# Patient Record
Sex: MALE | Race: WHITE | NOT HISPANIC OR LATINO | URBAN - METROPOLITAN AREA
[De-identification: names, ages, dates, MRNs, and addresses within clinical notes are randomized per-mention and may not be internally consistent; named-entity substitution may affect disease eponyms.]

---

## 2017-05-15 ENCOUNTER — INPATIENT (INPATIENT)
Facility: HOSPITAL | Age: 27
LOS: 9 days | Discharge: HOME | End: 2017-05-25
Attending: PSYCHIATRY & NEUROLOGY | Admitting: PSYCHIATRY & NEUROLOGY
Payer: COMMERCIAL

## 2017-05-15 VITALS
HEART RATE: 75 BPM | SYSTOLIC BLOOD PRESSURE: 149 MMHG | OXYGEN SATURATION: 100 % | TEMPERATURE: 98 F | RESPIRATION RATE: 16 BRPM | DIASTOLIC BLOOD PRESSURE: 101 MMHG

## 2017-05-15 DIAGNOSIS — F31.9 BIPOLAR DISORDER, UNSPECIFIED: ICD-10-CM

## 2017-05-15 LAB
ALBUMIN SERPL ELPH-MCNC: 4.7 G/DL — SIGNIFICANT CHANGE UP (ref 3.3–5)
ALP SERPL-CCNC: 38 U/L — LOW (ref 40–120)
ALT FLD-CCNC: 15 U/L — SIGNIFICANT CHANGE UP (ref 4–41)
AMPHET UR-MCNC: NEGATIVE — SIGNIFICANT CHANGE UP
APAP SERPL-MCNC: < 15 UG/ML — LOW (ref 15–25)
APPEARANCE UR: CLEAR — SIGNIFICANT CHANGE UP
AST SERPL-CCNC: 25 U/L — SIGNIFICANT CHANGE UP (ref 4–40)
BARBITURATES MEASUREMENT: NEGATIVE — SIGNIFICANT CHANGE UP
BARBITURATES UR SCN-MCNC: NEGATIVE — SIGNIFICANT CHANGE UP
BASOPHILS # BLD AUTO: 0.06 K/UL — SIGNIFICANT CHANGE UP (ref 0–0.2)
BASOPHILS NFR BLD AUTO: 0.5 % — SIGNIFICANT CHANGE UP (ref 0–2)
BENZODIAZ SERPL-MCNC: NEGATIVE — SIGNIFICANT CHANGE UP
BENZODIAZ UR-MCNC: NEGATIVE — SIGNIFICANT CHANGE UP
BILIRUB SERPL-MCNC: 0.5 MG/DL — SIGNIFICANT CHANGE UP (ref 0.2–1.2)
BILIRUB UR-MCNC: NEGATIVE — SIGNIFICANT CHANGE UP
BLOOD UR QL VISUAL: NEGATIVE — SIGNIFICANT CHANGE UP
BUN SERPL-MCNC: 22 MG/DL — SIGNIFICANT CHANGE UP (ref 7–23)
CALCIUM SERPL-MCNC: 9.3 MG/DL — SIGNIFICANT CHANGE UP (ref 8.4–10.5)
CANNABINOIDS UR-MCNC: NEGATIVE — SIGNIFICANT CHANGE UP
CHLORIDE SERPL-SCNC: 104 MMOL/L — SIGNIFICANT CHANGE UP (ref 98–107)
CO2 SERPL-SCNC: 25 MMOL/L — SIGNIFICANT CHANGE UP (ref 22–31)
COCAINE METAB.OTHER UR-MCNC: NEGATIVE — SIGNIFICANT CHANGE UP
COLOR SPEC: SIGNIFICANT CHANGE UP
CREAT SERPL-MCNC: 1.49 MG/DL — HIGH (ref 0.5–1.3)
EOSINOPHIL # BLD AUTO: 0.31 K/UL — SIGNIFICANT CHANGE UP (ref 0–0.5)
EOSINOPHIL NFR BLD AUTO: 2.7 % — SIGNIFICANT CHANGE UP (ref 0–6)
ETHANOL BLD-MCNC: < 10 MG/DL — SIGNIFICANT CHANGE UP
GLUCOSE SERPL-MCNC: 91 MG/DL — SIGNIFICANT CHANGE UP (ref 70–99)
GLUCOSE UR-MCNC: NEGATIVE — SIGNIFICANT CHANGE UP
HCT VFR BLD CALC: 41 % — SIGNIFICANT CHANGE UP (ref 39–50)
HGB BLD-MCNC: 14.4 G/DL — SIGNIFICANT CHANGE UP (ref 13–17)
IMM GRANULOCYTES NFR BLD AUTO: 0.3 % — SIGNIFICANT CHANGE UP (ref 0–1.5)
KETONES UR-MCNC: NEGATIVE — SIGNIFICANT CHANGE UP
LEUKOCYTE ESTERASE UR-ACNC: NEGATIVE — SIGNIFICANT CHANGE UP
LYMPHOCYTES # BLD AUTO: 28.4 % — SIGNIFICANT CHANGE UP (ref 13–44)
LYMPHOCYTES # BLD AUTO: 3.3 K/UL — SIGNIFICANT CHANGE UP (ref 1–3.3)
MCHC RBC-ENTMCNC: 31.8 PG — SIGNIFICANT CHANGE UP (ref 27–34)
MCHC RBC-ENTMCNC: 35.1 % — SIGNIFICANT CHANGE UP (ref 32–36)
MCV RBC AUTO: 90.5 FL — SIGNIFICANT CHANGE UP (ref 80–100)
METHADONE UR-MCNC: NEGATIVE — SIGNIFICANT CHANGE UP
MONOCYTES # BLD AUTO: 0.65 K/UL — SIGNIFICANT CHANGE UP (ref 0–0.9)
MONOCYTES NFR BLD AUTO: 5.6 % — SIGNIFICANT CHANGE UP (ref 2–14)
NEUTROPHILS # BLD AUTO: 7.27 K/UL — SIGNIFICANT CHANGE UP (ref 1.8–7.4)
NEUTROPHILS NFR BLD AUTO: 62.5 % — SIGNIFICANT CHANGE UP (ref 43–77)
NITRITE UR-MCNC: NEGATIVE — SIGNIFICANT CHANGE UP
OPIATES UR-MCNC: NEGATIVE — SIGNIFICANT CHANGE UP
OXYCODONE UR-MCNC: NEGATIVE — SIGNIFICANT CHANGE UP
PCP UR-MCNC: NEGATIVE — SIGNIFICANT CHANGE UP
PH UR: 6.5 — SIGNIFICANT CHANGE UP (ref 4.6–8)
PLATELET # BLD AUTO: 195 K/UL — SIGNIFICANT CHANGE UP (ref 150–400)
PMV BLD: 10.5 FL — SIGNIFICANT CHANGE UP (ref 7–13)
POTASSIUM SERPL-MCNC: 3.9 MMOL/L — SIGNIFICANT CHANGE UP (ref 3.5–5.3)
POTASSIUM SERPL-SCNC: 3.9 MMOL/L — SIGNIFICANT CHANGE UP (ref 3.5–5.3)
PROT SERPL-MCNC: 6.8 G/DL — SIGNIFICANT CHANGE UP (ref 6–8.3)
PROT UR-MCNC: NEGATIVE — SIGNIFICANT CHANGE UP
RBC # BLD: 4.53 M/UL — SIGNIFICANT CHANGE UP (ref 4.2–5.8)
RBC # FLD: 13.3 % — SIGNIFICANT CHANGE UP (ref 10.3–14.5)
RBC CASTS # UR COMP ASSIST: SIGNIFICANT CHANGE UP (ref 0–?)
SALICYLATES SERPL-MCNC: < 5 MG/DL — LOW (ref 15–30)
SODIUM SERPL-SCNC: 145 MMOL/L — SIGNIFICANT CHANGE UP (ref 135–145)
SP GR SPEC: 1 — LOW (ref 1–1.03)
TSH SERPL-MCNC: 6.31 UIU/ML — HIGH (ref 0.27–4.2)
UROBILINOGEN FLD QL: NORMAL E.U. — SIGNIFICANT CHANGE UP (ref 0.1–0.2)
WBC # BLD: 11.62 K/UL — HIGH (ref 3.8–10.5)
WBC # FLD AUTO: 11.62 K/UL — HIGH (ref 3.8–10.5)
WBC UR QL: SIGNIFICANT CHANGE UP (ref 0–?)

## 2017-05-15 NOTE — ED BEHAVIORAL HEALTH ASSESSMENT NOTE - OTHER PAST PSYCHIATRIC HISTORY (INCLUDE DETAILS REGARDING ONSET, COURSE OF ILLNESS, INPATIENT/OUTPATIENT TREATMENT)
PPH of bipolar disorder, one prior admission to [facility] for 2 weeks over 1 month ago, previously on abilify and recently changed to saphris 9 days ago by outpatient psychiatrist Dr. Aditya Coreas, no prior suicide attempts or SIB

## 2017-05-15 NOTE — ED BEHAVIORAL HEALTH ASSESSMENT NOTE - RISK ASSESSMENT
high. patient is non compliant with medications, disorganized, paranoid,  acting bizarre, aggressive towards family, disappearing and wandering for miles. He is an imminent risk to self and others requiring inpatient admission.

## 2017-05-15 NOTE — ED BEHAVIORAL HEALTH ASSESSMENT NOTE - HPI (INCLUDE ILLNESS QUALITY, SEVERITY, DURATION, TIMING, CONTEXT, MODIFYING FACTORS, ASSOCIATED SIGNS AND SYMPTOMS)
27yo  male, single, domiciled with parents, unemployed (used to work as an  at [employer] until 3 yrs ago), PPH of bipolar disorder, one prior admission to [facility] for 2 weeks over 1 month ago, previously on abilify and recently changed to saphris 9 days ago by outpatient psychiatrist Dr. Aditya Coreas, no prior suicide attempts or SIB, no legal history, no history of drug use or significant PMH, brought in by father for worsening aggression and disorganization.     Collateral obtained from father Bill Jordan, present in ED. He reports that patient started to decompensate about 3yrs ago. Prior to that he was working for [employer] and doing well. 27yo  male, single, domiciled with parents, unemployed (used to work as an  at [employer] until 3 yrs ago), PPH of bipolar disorder, one prior admission to [facility] for 2 weeks over 1 month ago, previously on abilify and recently changed to saphris 9 days ago by outpatient psychiatrist Dr. Aditya Coreas, no prior suicide attempts or SIB, no legal history, no history of drug use or significant PMH, brought in by father for worsening aggression and disorganization.     Collateral obtained from father Blil Jordan, present in ED. He reports that patient started to decompensate about 3yrs ago. Prior to that he was working for [employer] and doing well. Patient was hospitalized at [facility] over 1 month ago for about 2 weeks after a similar presentation. He was put on abilify and titrated up to 20mg then discharged. His outpatient provider raised it to 30mg and it did not work so he was recently switched to Saphris 10mg 9 days ago. Father reports patient has not been doing well. He has been increasingly aggressive and paranoid, going up to father and mother and chest bumping them while screaming "fuck you" to them. He has been more hostile, cursing, severely psychotic, talking to himself and laughing to himself. He has been making random statements such as "Even the [employer] couldn't make things work". Today patient took everything in his room and threw it in the hallway. He then went online and spent thousands of dollars buying [employer] clothing, for no apparent reason. He has also been making statements such as "I'm not feeling well, I don't want to live". He has also been leaving the house and walking for miles. This morning the patient placed his finger in his rectum then placed that finger in his nostril. Patient has likely been non compliant with his medications as father brought all patient's medications and very few from the blister packs were opened. Father is concerned for the patient based on the level of disorganization he has been seeing and mother is fearful as patient has been hostile. They are requesting admission at this time. 27yo  male, single, domiciled with parents, unemployed (used to work as an  at [employer] until 3 yrs ago), PPH of bipolar disorder, one prior admission to [facility] for 2 weeks over 1 month ago, previously on abilify and recently changed to saphris 9 days ago by outpatient psychiatrist Dr. Aditya Coreas, no prior suicide attempts or SIB, no legal history, no history of drug use or significant PMH, brought in by father for worsening aggression and disorganization.     Collateral obtained from father Bill Jordan, present in ED. He reports that patient started to decompensate about 3yrs ago. Prior to that he was working for [employer] and doing well. Patient was hospitalized at [facility] over 1 month ago for about 2 weeks after a similar presentation. He was put on abilify and titrated up to 20mg then discharged. His outpatient provider raised it to 30mg and it did not work so he was recently switched to Saphris 10mg 9 days ago. Father reports patient has not been doing well. He has been increasingly aggressive and paranoid, going up to father and mother and chest bumping them while screaming "fuck you" to them. He has been more hostile, cursing, severely psychotic, talking to himself and laughing to himself. He has been making random statements such as "Even the [employer] couldn't make things work". Today patient took everything in his room and threw it in the hallway. He then went online and spent thousands of dollars buying [employer] clothing, for no apparent reason. He has also been making statements such as "I'm not feeling well, I don't want to live". He has also been leaving the house and walking for miles. This morning the patient placed his finger in his rectum then placed that finger in his nostril. Patient has likely been non compliant with his medications as father brought all patient's medications and very few from the blister packs were opened. Father is concerned for the patient based on the level of disorganization he has been seeing and mother is fearful as patient has been hostile. They are requesting admission at this time.    Patient reports he is not sure why he is in the hospital. Reports that his mother was concerned because he was cleaning his closet. "I'm a transgendered homosexual", he reports, which is why he was cleaning out his closet. Denies all psychiatric symptoms. Reports taking his medications. When confronted with collateral and unopened medication reports, patient became quiet and said that he would go home and take his medications, refuses to answer rest of questions about collateral. Reports he is sleeping "about 4hrs" at night, denies decreased energy or sleepiness during the day. Agreed to voluntary admission for re-evaluation of medications and for safety.

## 2017-05-15 NOTE — ED BEHAVIORAL HEALTH ASSESSMENT NOTE - SUICIDE PROTECTIVE FACTORS
Positive therapeutic relationships/Identifies reasons for living/Supportive social network or family/Future oriented

## 2017-05-15 NOTE — ED PROVIDER NOTE - MEDICAL DECISION MAKING DETAILS
25 y/o M hx Psychosis  No evidence of physical injuries, broken skin or deformities.   Medical evaluation performed. There is no clinical evidence of intoxication or any acute medical problem requiring immediate intervention. Patient is awaiting psychiatric consultation. Final disposition will be determined by psychiatrist. 25 y/o M hx Psychosis  No evidence of physical injuries, broken skin or deformities. Labs, Urine Tox/UA, EKG     Medical evaluation performed. There is no clinical evidence of intoxication or any acute medical problem requiring immediate intervention. Patient is awaiting psychiatric consultation. Final disposition will be determined by psychiatrist.

## 2017-05-15 NOTE — ED ADULT NURSE NOTE - CHIEF COMPLAINT QUOTE
pt. with a hx. of psychosis states he was organizing his closet earlier today and his mother was concerned he was having disorganized thinking pattern. Pt. denies taking any drugs or alcohol. Pt. denies any thoughts of hurting himself or others. Pt. states he is feeling great. Pt. appears calm in room. BH attending called , will be brought over to .

## 2017-05-15 NOTE — ED ADULT TRIAGE NOTE - CHIEF COMPLAINT QUOTE
pt. with a hx. of psychosis states he was organizing his closet earlier today and his mother was concerned he was having disorganized thinking pattern. Pt. denies taking any drugs or alcohol. Pt. denies any thoughts of hurting himself or others. Pt. states he is feeling great. Pt. appears calm in room. pt. with a hx. of psychosis states he was organizing his closet earlier today and his mother was concerned he was having disorganized thinking pattern. Pt. denies taking any drugs or alcohol. Pt. denies any thoughts of hurting himself or others. Pt. states he is feeling great. Pt. appears calm in room. BH attending called , will be brought over to .

## 2017-05-15 NOTE — ED ADULT NURSE NOTE - OBJECTIVE STATEMENT
pt seen, as per pt "my mom thinks i'm having disorganized thought pattern". pt denies SI,HI&AH. denies ETOH or substance use. pt calm and cooperative in . psych eval ongoing.

## 2017-05-15 NOTE — ED BEHAVIORAL HEALTH ASSESSMENT NOTE - SUMMARY
25yo  male, single, domiciled with parents, unemployed (used to work as an  at [employer] until 3 yrs ago), PPH of bipolar disorder, one prior admission to [facility] for 2 weeks over 1 month ago, previously on abilify and recently changed to saphris 9 days ago by outpatient psychiatrist Dr. Aditya Coreas, no prior suicide attempts or SIB, no legal history, no history of drug use or significant PMH, brought in by father for worsening aggression and disorganization.     Patient denies all symptoms in ED. Per collateral obtained from father, patient has been decompensating. He is non compliant with his medications, spending thousands of dollars on clothing, disorganized, threatening and becoming aggressive towards them, doing bizarre things (such as placing finger in his rectum then in his nose) and not at his baseline. He is severely decompensated and requires inpatient admission for safety and stabilization.

## 2017-05-15 NOTE — ED BEHAVIORAL HEALTH ASSESSMENT NOTE - PSYCHIATRIC ISSUES AND PLAN (INCLUDE STANDING AND PRN MEDICATION)
would consider d/c saphris and starting risperdal or other medication with RODRIGUES option. Haldol 5mg PO q 4 hrs prn psychosis; Ativan 2mg PO q 4hrs prn anxiety, Diphenhydramine 50mg, PO q 4hr prn EPS, insomnia or agitaition.      Haldol 5mg IM; Ativan 2mg IM, Diphenhydramine 50mg IM once prn severe agitation (combativeness, assaultive behavior)

## 2017-05-15 NOTE — ED PROVIDER NOTE - OBJECTIVE STATEMENT
27 y/o M hx Psychosis BIB parent w need for evaluation as patient  stated " They think im disorganised" secondary to him arranging his closet. Denies  punching or kicking any objects. Denies pain, SOB, fever, chills, chest/ abdominal discomfort. Denies SI/HI/AH/VH. Denies recent use of alcohol or illicit drugs.

## 2017-05-16 DIAGNOSIS — R45.1 RESTLESSNESS AND AGITATION: ICD-10-CM

## 2017-05-16 PROCEDURE — 99222 1ST HOSP IP/OBS MODERATE 55: CPT

## 2017-05-16 RX ORDER — DIPHENHYDRAMINE HCL 50 MG
50 CAPSULE ORAL ONCE
Qty: 0 | Refills: 0 | Status: DISCONTINUED | OUTPATIENT
Start: 2017-05-16 | End: 2017-05-25

## 2017-05-16 RX ORDER — PALIPERIDONE 1.5 MG/1
3 TABLET, EXTENDED RELEASE ORAL AT BEDTIME
Qty: 0 | Refills: 0 | Status: DISCONTINUED | OUTPATIENT
Start: 2017-05-16 | End: 2017-05-17

## 2017-05-16 RX ORDER — HALOPERIDOL DECANOATE 100 MG/ML
5 INJECTION INTRAMUSCULAR EVERY 6 HOURS
Qty: 0 | Refills: 0 | Status: DISCONTINUED | OUTPATIENT
Start: 2017-05-16 | End: 2017-05-25

## 2017-05-16 RX ORDER — HALOPERIDOL DECANOATE 100 MG/ML
5 INJECTION INTRAMUSCULAR ONCE
Qty: 0 | Refills: 0 | Status: DISCONTINUED | OUTPATIENT
Start: 2017-05-16 | End: 2017-05-25

## 2017-05-16 RX ORDER — DIPHENHYDRAMINE HCL 50 MG
50 CAPSULE ORAL EVERY 6 HOURS
Qty: 0 | Refills: 0 | Status: DISCONTINUED | OUTPATIENT
Start: 2017-05-16 | End: 2017-05-25

## 2017-05-16 RX ADMIN — PALIPERIDONE 3 MILLIGRAM(S): 1.5 TABLET, EXTENDED RELEASE ORAL at 20:49

## 2017-05-17 LAB
ALBUMIN SERPL ELPH-MCNC: 4.7 G/DL — SIGNIFICANT CHANGE UP (ref 3.3–5)
ALP SERPL-CCNC: 44 U/L — SIGNIFICANT CHANGE UP (ref 40–120)
ALT FLD-CCNC: 17 U/L — SIGNIFICANT CHANGE UP (ref 4–41)
AST SERPL-CCNC: 23 U/L — SIGNIFICANT CHANGE UP (ref 4–40)
BILIRUB SERPL-MCNC: 0.5 MG/DL — SIGNIFICANT CHANGE UP (ref 0.2–1.2)
BUN SERPL-MCNC: 21 MG/DL — SIGNIFICANT CHANGE UP (ref 7–23)
CALCIUM SERPL-MCNC: 9.6 MG/DL — SIGNIFICANT CHANGE UP (ref 8.4–10.5)
CHLORIDE SERPL-SCNC: 102 MMOL/L — SIGNIFICANT CHANGE UP (ref 98–107)
CHOLEST SERPL-MCNC: 161 MG/DL — SIGNIFICANT CHANGE UP (ref 120–199)
CO2 SERPL-SCNC: 24 MMOL/L — SIGNIFICANT CHANGE UP (ref 22–31)
CREAT SERPL-MCNC: 1.21 MG/DL — SIGNIFICANT CHANGE UP (ref 0.5–1.3)
GLUCOSE SERPL-MCNC: 80 MG/DL — SIGNIFICANT CHANGE UP (ref 70–99)
HBA1C BLD-MCNC: 5.3 % — SIGNIFICANT CHANGE UP (ref 4–5.6)
HDLC SERPL-MCNC: 43 MG/DL — SIGNIFICANT CHANGE UP (ref 35–55)
LIPID PNL WITH DIRECT LDL SERPL: 99 MG/DL — SIGNIFICANT CHANGE UP
POTASSIUM SERPL-MCNC: 4.4 MMOL/L — SIGNIFICANT CHANGE UP (ref 3.5–5.3)
POTASSIUM SERPL-SCNC: 4.4 MMOL/L — SIGNIFICANT CHANGE UP (ref 3.5–5.3)
PROT SERPL-MCNC: 7.6 G/DL — SIGNIFICANT CHANGE UP (ref 6–8.3)
SODIUM SERPL-SCNC: 143 MMOL/L — SIGNIFICANT CHANGE UP (ref 135–145)
T4 FREE SERPL-MCNC: 1.43 NG/DL — SIGNIFICANT CHANGE UP (ref 0.9–1.8)
TRIGL SERPL-MCNC: 93 MG/DL — SIGNIFICANT CHANGE UP (ref 10–149)
TSH SERPL-MCNC: 3.45 UIU/ML — SIGNIFICANT CHANGE UP (ref 0.27–4.2)

## 2017-05-17 PROCEDURE — 99232 SBSQ HOSP IP/OBS MODERATE 35: CPT

## 2017-05-17 RX ORDER — PALIPERIDONE 1.5 MG/1
6 TABLET, EXTENDED RELEASE ORAL AT BEDTIME
Qty: 0 | Refills: 0 | Status: DISCONTINUED | OUTPATIENT
Start: 2017-05-17 | End: 2017-05-22

## 2017-05-17 RX ADMIN — PALIPERIDONE 6 MILLIGRAM(S): 1.5 TABLET, EXTENDED RELEASE ORAL at 21:04

## 2017-05-18 PROCEDURE — 99232 SBSQ HOSP IP/OBS MODERATE 35: CPT

## 2017-05-18 RX ORDER — PALIPERIDONE 1.5 MG/1
234 TABLET, EXTENDED RELEASE ORAL ONCE
Qty: 0 | Refills: 0 | Status: COMPLETED | OUTPATIENT
Start: 2017-05-18 | End: 2017-05-18

## 2017-05-18 RX ADMIN — PALIPERIDONE 234 MILLIGRAM(S): 1.5 TABLET, EXTENDED RELEASE ORAL at 11:34

## 2017-05-18 RX ADMIN — PALIPERIDONE 6 MILLIGRAM(S): 1.5 TABLET, EXTENDED RELEASE ORAL at 20:39

## 2017-05-19 PROCEDURE — 99232 SBSQ HOSP IP/OBS MODERATE 35: CPT

## 2017-05-19 RX ADMIN — PALIPERIDONE 6 MILLIGRAM(S): 1.5 TABLET, EXTENDED RELEASE ORAL at 22:01

## 2017-05-20 PROCEDURE — 99232 SBSQ HOSP IP/OBS MODERATE 35: CPT

## 2017-05-20 RX ORDER — DOCUSATE SODIUM 100 MG
100 CAPSULE ORAL
Qty: 0 | Refills: 0 | Status: DISCONTINUED | OUTPATIENT
Start: 2017-05-20 | End: 2017-05-25

## 2017-05-20 RX ADMIN — PALIPERIDONE 6 MILLIGRAM(S): 1.5 TABLET, EXTENDED RELEASE ORAL at 21:16

## 2017-05-21 RX ORDER — CALCIUM CARBONATE 500(1250)
1 TABLET ORAL ONCE
Qty: 0 | Refills: 0 | Status: COMPLETED | OUTPATIENT
Start: 2017-05-21 | End: 2017-05-21

## 2017-05-21 RX ADMIN — PALIPERIDONE 6 MILLIGRAM(S): 1.5 TABLET, EXTENDED RELEASE ORAL at 21:33

## 2017-05-21 RX ADMIN — Medication 1 TABLET(S): at 17:28

## 2017-05-22 PROCEDURE — 99232 SBSQ HOSP IP/OBS MODERATE 35: CPT

## 2017-05-22 RX ORDER — PALIPERIDONE 1.5 MG/1
3 TABLET, EXTENDED RELEASE ORAL AT BEDTIME
Qty: 0 | Refills: 0 | Status: DISCONTINUED | OUTPATIENT
Start: 2017-05-22 | End: 2017-05-25

## 2017-05-22 RX ORDER — PALIPERIDONE 1.5 MG/1
4.5 TABLET, EXTENDED RELEASE ORAL AT BEDTIME
Qty: 0 | Refills: 0 | Status: DISCONTINUED | OUTPATIENT
Start: 2017-05-22 | End: 2017-05-22

## 2017-05-22 RX ADMIN — Medication 50 MILLIGRAM(S): at 21:30

## 2017-05-22 RX ADMIN — PALIPERIDONE 3 MILLIGRAM(S): 1.5 TABLET, EXTENDED RELEASE ORAL at 21:30

## 2017-05-23 PROCEDURE — 99232 SBSQ HOSP IP/OBS MODERATE 35: CPT

## 2017-05-23 RX ORDER — PALIPERIDONE 1.5 MG/1
156 TABLET, EXTENDED RELEASE ORAL ONCE
Qty: 0 | Refills: 0 | Status: COMPLETED | OUTPATIENT
Start: 2017-05-24 | End: 2017-05-24

## 2017-05-23 RX ADMIN — Medication 50 MILLIGRAM(S): at 17:50

## 2017-05-23 RX ADMIN — PALIPERIDONE 3 MILLIGRAM(S): 1.5 TABLET, EXTENDED RELEASE ORAL at 21:30

## 2017-05-24 PROCEDURE — 99232 SBSQ HOSP IP/OBS MODERATE 35: CPT

## 2017-05-24 RX ADMIN — PALIPERIDONE 3 MILLIGRAM(S): 1.5 TABLET, EXTENDED RELEASE ORAL at 21:50

## 2017-05-24 RX ADMIN — PALIPERIDONE 156 MILLIGRAM(S): 1.5 TABLET, EXTENDED RELEASE ORAL at 09:04

## 2017-05-24 RX ADMIN — Medication 50 MILLIGRAM(S): at 04:51

## 2017-05-24 RX ADMIN — Medication 50 MILLIGRAM(S): at 16:19

## 2017-05-24 RX ADMIN — Medication 50 MILLIGRAM(S): at 22:15

## 2017-05-25 VITALS — TEMPERATURE: 97 F

## 2017-05-25 PROCEDURE — 99239 HOSP IP/OBS DSCHRG MGMT >30: CPT

## 2017-05-25 RX ADMIN — Medication 50 MILLIGRAM(S): at 04:15

## 2017-05-25 RX ADMIN — Medication 50 MILLIGRAM(S): at 10:00

## 2017-05-30 PROBLEM — F29 UNSPECIFIED PSYCHOSIS NOT DUE TO A SUBSTANCE OR KNOWN PHYSIOLOGICAL CONDITION: Chronic | Status: ACTIVE | Noted: 2017-05-15

## 2017-05-31 ENCOUNTER — OUTPATIENT (OUTPATIENT)
Dept: OUTPATIENT SERVICES | Facility: HOSPITAL | Age: 27
LOS: 1 days | Discharge: HOME | End: 2017-05-31

## 2017-06-01 DIAGNOSIS — F20.0 PARANOID SCHIZOPHRENIA: ICD-10-CM

## 2019-07-02 PROBLEM — Z00.00 ENCOUNTER FOR PREVENTIVE HEALTH EXAMINATION: Status: ACTIVE | Noted: 2019-07-02

## 2019-07-08 ENCOUNTER — APPOINTMENT (OUTPATIENT)
Dept: ORTHOPEDIC SURGERY | Facility: CLINIC | Age: 29
End: 2019-07-08
Payer: COMMERCIAL

## 2019-07-08 VITALS — BODY MASS INDEX: 25.62 KG/M2 | HEIGHT: 77 IN | WEIGHT: 217 LBS

## 2019-07-08 DIAGNOSIS — Z78.9 OTHER SPECIFIED HEALTH STATUS: ICD-10-CM

## 2019-07-08 PROCEDURE — 99204 OFFICE O/P NEW MOD 45 MIN: CPT

## 2019-07-08 NOTE — DISCUSSION/SUMMARY
[de-identified] : I discussed the underlying pathophysiology of the patient's condition in great detail with the patient. I also reviewed the patient's X-rays with him in great details, and answered all his questions. I told the patient that he should see a PT to increase strength and mobility of his right knee. A prescription for Physical Therapy was provided to the patient.  If there is no improvement an MRi may be considered. \par \par FU in 1 month

## 2019-07-08 NOTE — HISTORY OF PRESENT ILLNESS
[de-identified] : 29 year old male presents with right knee pain. He states that he first felt it in January, while doing cross fit and playing tennis. However, he cannot attribute it to a specific trauma. He also stopped cross fit, and worked on strengthening his right knee. Recently, he has stopped tennis and started cross fit again. He feels as if he is no longer improving. He also feels as if the knee can buckle at any time, but only occasionally, not while walking. He currently does not have any swelling, but states that he had swelling in May. He has been going to a chiropractor to try to deal with the pain.He has been treated by  a chiropractor and has never been to formal physical therapy especially a sports that\par \par XRays taken at [facility] on 5/7/19 revealed (report)\par 1) No acute fx or osseous stress reaction. Joint spaces and alignment are maintained. Small superior patellar enthesophyte formation. No areas of erosion or aggresive-appearing bone destruction are identified. No significant effusion or discrete loose body.

## 2019-07-08 NOTE — ADDENDUM
[FreeTextEntry1] : I, Ashley Haney, acted solely as a scribe for Dr. Ryan Webber on this date 07/08/2019 \par All medical record entries made by the Scribe were at my, Dr. Ryan Webber, direction and personally dictated by me on 07/08/2019 . I have reviewed the chart and agree that the record accurately reflects my personal performance of the history, physical exam, assessment and plan. I have also personally directed, reviewed, and agreed with the chart.

## 2019-07-08 NOTE — PHYSICAL EXAM
[de-identified] : Constitutional\par o Appearance : well-nourished, well developed, alert, in no acute distress \par Head and Face\par o Head :\par ¦ Inspection : atraumatic, normocephalic\par o Face :\par ¦ Inspection : no visible rash or discoloration\par Respiratory\par o Respiratory Effort: breathing unlabored \par Neurologic\par o Mental Status Examination :\par ¦ Orientation : alert and oriented X 3\par Psychiatric\par o Mood and Affect: mood normal, affect appropriate\par Cardiovascular\par o Observation/Palpation : - no swelling\par Lymphatic\par o Additional Nodes : No palpable lymph nodes present\par \par Right Lower Extremity\par o Buttock : no tenderness, swelling or deformities \par o Right Hip :\par    - Inspection/Palpation : no tenderness, no swelling or deformities\par    - Range of Motion : full and painless in all planes, no crepitance\par    - Stability : joint stability intact\par    - Strength : extension, flexion, adduction, abduction, internal rotation and external rotation 5/5 \par    - Tests: Dario’s test negative\par o Right Knee :\par ¦ Inspection/Palpation : inferior patella tenderness, no swelling\par ¦ Range of Motion : active flexion and extension full and painless, no crepitance, good patellofemoral glide without hypermobility\par ¦ Stability : no valgus or varus instability present on provocative testing\par ¦ Strength : flexion and extension 4/5\par ¦ Tests and Signs : negative Anterior Drawer, negative Lachman, negative Emelyn\par \par Left Lower Extremity\par o Buttock : no tenderness, swelling or deformities \par o Left Hip :\par    - Inspection/Palpation : no tenderness, no swelling or deformities\par    - Range of Motion : full and painless in all planes, no crepitance\par    - Stability : joint stability intact\par    - Strength : extension, flexion, adduction, abduction, internal rotation and external rotation 5/5 \par    - Tests: Dario’s test negative\par o Left Knee :\par ¦ Inspection/Palpation : no tenderness to palpation, no swelling\par ¦ Range of Motion : active flexion and extension full and painless, no crepitance, good patellofemoral glide\par ¦ Stability : no valgus or varus instability present on provocative testing\par ¦ Strength : flexion and extension 5/5\par ¦ Tests and Signs : negative Anterior Drawer, negative Lachman, negative Emelyn\par \par Gait and Station:\par Gait: gait normal, no significant extremity swelling or lymphedema, good proprioception and balance, heel walking, toe walking, running in place didn't cause discomfort, mildly tight hamstring bilaterally\par \par Radiology Results\par o Right Knee : Standing AP,lateral and tunnel and skyline views of the knee were obtained and revealed  no significant degenerative arthritis, but reveals mild flattening of medial condyle\par

## 2019-08-05 ENCOUNTER — APPOINTMENT (OUTPATIENT)
Dept: ORTHOPEDIC SURGERY | Facility: CLINIC | Age: 29
End: 2019-08-05

## 2019-08-14 ENCOUNTER — APPOINTMENT (OUTPATIENT)
Dept: ORTHOPEDIC SURGERY | Facility: CLINIC | Age: 29
End: 2019-08-14
Payer: COMMERCIAL

## 2019-08-14 VITALS — WEIGHT: 217 LBS | BODY MASS INDEX: 25.62 KG/M2 | HEIGHT: 77 IN

## 2019-08-14 PROCEDURE — 99213 OFFICE O/P EST LOW 20 MIN: CPT

## 2020-06-10 ENCOUNTER — APPOINTMENT (OUTPATIENT)
Dept: ORTHOPEDIC SURGERY | Facility: CLINIC | Age: 30
End: 2020-06-10
Payer: COMMERCIAL

## 2020-06-10 PROCEDURE — 99213 OFFICE O/P EST LOW 20 MIN: CPT

## 2020-07-23 ENCOUNTER — APPOINTMENT (OUTPATIENT)
Dept: ORTHOPEDIC SURGERY | Facility: CLINIC | Age: 30
End: 2020-07-23
Payer: COMMERCIAL

## 2020-07-23 PROCEDURE — 99214 OFFICE O/P EST MOD 30 MIN: CPT

## 2020-07-23 PROCEDURE — 73562 X-RAY EXAM OF KNEE 3: CPT | Mod: RT

## 2020-07-23 NOTE — DISCUSSION/SUMMARY
[de-identified] : I went over the pathophysiology of the patient's symptoms in great detail. We discussed the use of ice, Tylenol and anti-inflammatories to relieve pain. The patient was instructed in ROM exercises they are to do at home. At-home strengthening, and stretching exercises were discussed and demonstrated with the patient. We went over the wide ranging benefits of exercise for the patient health and I encouraged him to begin a diligent exercise program. He needs to avoid high-impact activities such as running and jumping. I recommend alternative activities such as riding a stationary bike on low tension, or the elliptical. He should focus on light weight and high repetition exercises. He  should avoid squatting, and kneeling. I am recommending the patient continue going to physical therapy to obtain increases in their strength and mobility. A prescription for PT was provided to the patient. All of his questions were answered. He understands and consents to the plan. \par \par FU 1 month.\par after continuing PT for his right knee.

## 2020-07-23 NOTE — HISTORY OF PRESENT ILLNESS
[de-identified] : 30 year old male presents with right knee pain. He states that he first felt it in January 2019 while doing cross fit and playing tennis. However, he cannot attribute it to a specific trauma. He has stopped cross fit and tennis. He occasionally feels as if the knee can buckle at any time, but not while walking. He currently does not have any swelling. He has been attending PT, but feels that it may have been too aggressive and had aggravated pain to a 5-6/10 in severity. Today he only has low grade pain and discomfort. He does not think he will ever do cross fit again, but would like to return to playing tennis in the near future. He has been up to as much as 300 pounds but is now down to 265. He would like to get to 200 which he feels is realistic. The medication he is on causes weight gain.

## 2020-07-23 NOTE — ADDENDUM
[FreeTextEntry1] : I, Sumeet Dorsey, acted solely as a scribe for Dr. Ryan Webber on this date 07/23/2020.\par All medical record entries made by the Scribe were at my, Dr. Ryan Webber, direction and personally dictated by me on 07/23/2020. I have reviewed the chart and agree that the record accurately reflects my personal performance of the history, physical exam, assessment and plan. I have also personally directed, reviewed, and agreed with the chart.

## 2020-07-23 NOTE — PHYSICAL EXAM
[de-identified] : Constitutional\par o Appearance : well-nourished, well developed, alert, in no acute distress \par Head and Face\par o Head :\par ¦ Inspection : atraumatic, normocephalic\par o Face :\par ¦ Inspection : no visible rash or discoloration\par Respiratory\par o Respiratory Effort: breathing unlabored \par Neurologic\par o Mental Status Examination :\par ¦ Orientation : alert and oriented X 3\par Psychiatric\par o Mood and Affect: mood normal, affect appropriate\par Cardiovascular\par o Observation/Palpation : - no swelling\par Lymphatic\par o Additional Nodes : No palpable lymph nodes present\par \par Right Lower Extremity\par o Buttock : no tenderness, swelling or deformities \par o Right Hip :\par    - Inspection/Palpation : ITB tenderness, no swelling or deformities, tight hamstrings\par    - Range of Motion : full and painless in all planes, no crepitance\par    - Stability : joint stability intact\par    - Strength : extension, flexion, adduction, abduction, internal rotation and external rotation 4-4+/5 \par    - Tests: Dario’s test negative\par \par o Right Knee :\par ¦ Inspection/Palpation : medial compartment tenderness, minimal inferior patella tenderness, mild medial and lateral facet tenderness, no lateral compartment tenderness, no swelling\par ¦ Range of Motion : active flexion and extension full and painless, no crepitance, mild hypermobility of the patella\par ¦ Stability : no valgus or varus instability present on provocative testing\par ¦ Strength : flexion and extension 4-4+/5\par ¦ Tests and Signs : negative Anterior Drawer, negative Lachman, negative Emelyn\par \par Left Lower Extremity\par o Buttock : no tenderness, swelling or deformities \par o Left Hip :\par    - Inspection/Palpation : no tenderness, no swelling or deformities\par    - Range of Motion : full and painless in all planes, no crepitance\par    - Stability : joint stability intact\par    - Strength : extension, flexion, adduction, abduction, internal rotation and external rotation 5/5 \par    - Tests: Dario’s test negative\par \par o Left Knee :\par ¦ Inspection/Palpation : inferior patella tenderness to palpation, no swelling\par ¦ Range of Motion : active flexion and extension full and painless, no crepitance\par ¦ Stability : no valgus or varus instability present on provocative testing\par ¦ Strength : flexion and extension 5/5\par ¦ Tests and Signs : negative Anterior Drawer, negative Lachman, negative Emelyn\par \par Gait and Station:\par Gait: gait normal, no significant extremity swelling or lymphedema, good proprioception and balance\par \par Radiology Results\par o Right Knee : Standing AP,lateral and skyline views of the knee were obtained and revealed mild sclerosis of the medial tibial plateau and the patella

## 2020-08-17 ENCOUNTER — APPOINTMENT (OUTPATIENT)
Dept: ORTHOPEDIC SURGERY | Facility: CLINIC | Age: 30
End: 2020-08-17

## 2020-08-20 ENCOUNTER — APPOINTMENT (OUTPATIENT)
Dept: ORTHOPEDIC SURGERY | Facility: CLINIC | Age: 30
End: 2020-08-20
Payer: COMMERCIAL

## 2020-08-20 PROCEDURE — 99213 OFFICE O/P EST LOW 20 MIN: CPT

## 2020-09-03 ENCOUNTER — APPOINTMENT (OUTPATIENT)
Dept: ORTHOPEDIC SURGERY | Facility: CLINIC | Age: 30
End: 2020-09-03
Payer: COMMERCIAL

## 2020-09-03 DIAGNOSIS — M77.9 ENTHESOPATHY, UNSPECIFIED: ICD-10-CM

## 2020-09-03 DIAGNOSIS — L03.019 CELLULITIS OF UNSPECIFIED FINGER: ICD-10-CM

## 2020-09-03 PROCEDURE — 99214 OFFICE O/P EST MOD 30 MIN: CPT

## 2020-09-03 NOTE — PHYSICAL EXAM
[de-identified] : Constitutional\par o Appearance : well-nourished, well developed, alert, in no acute distress \par Head and Face\par o Head :\par ¦ Inspection : atraumatic, normocephalic\par o Face :\par ¦ Inspection : no visible rash or discoloration\par Respiratory\par o Respiratory Effort: breathing unlabored \par Neurologic\par o Sensation : Normal sensation \par Psychiatric\par o Mood and Affect: mood normal, affect appropriate \par Lymphatic\par o Additional Nodes : No palpable lymph nodes present \par \par Right Upper Extremity\par o Right Elbow :\par ¦ Inspection/Palpation : no tenderness, swelling or deformities\par ¦ Range of Motion : full and painless in all planes, no crepitance\par ¦ Strength : flexion and extension 5/5\par ¦ Stability : no joint instability on provocative testing \par o Sensation : sensation intact to light touch\par Tests: Tinel’s Sign negative\par \par o Right Forearm : no tenderness, swelling or deformities \par \par o Right Wrist:\par ¦ Inspection/Palpation : no tenderness, swelling or deformities\par ¦ Range of Motion : full and painless in all planes, no crepitance\par ¦ Strength : extension, flexion, ulnar deviation and radial deviation 5/5\par ¦ Stability : no joint instability on provocative testing\par ¦ Tests/Signs : Tinel's sign negative over carpal tunnel \par \par Left Upper Extremity\par o Left Elbow :\par ¦ Inspection/Palpation : tenderness over the extensor supinator muscle mass, no medial or lateral epicondylar tenderness, no swelling or deformities\par ¦ Range of Motion : full in all planes, no crepitance, minimal pain with resisted supination\par ¦ Strength : flexion and extension 5/5\par ¦ Stability : no joint instability on provocative testing \par o Sensation : sensation intact to light touch\par Tests: Tinel’s Sign negative, no pain with extension of the middle finger\par \par o Left Forearm : no tenderness, swelling or deformities\par \par o Left Wrist:\par ¦ Inspection/Palpation : no tenderness, no swelling or deformities\par ¦ Range of Motion : full and painless in all planes, no crepitance\par ¦ Strength : extension, flexion, ulnar deviation and radial deviation 5/5\par ¦ Stability : no joint instability on provocative testing\par ¦ Tests/Signs : Tinel's sign negative over carpal tunnel\par \par o Left Hand :\par ¦ Inspection/Palpation : tenderness over the DIP joint of the long and ring fingers with slight erythema, no swelling\par ¦ Range of Motion : full arc of motion in the small joints of the hand, no discomfort elicited, independent function of the sublimis and profundus tendons of the ring and long fingers\par ¦ Strength : all intrinsic and extrinsic hand muscles 5/5\par ¦ Stability : no joint instability on provocative testing \par o Sensation : sensation intact to light touch\par \par Gait and Station:\par Gait: gait normal, no significant extremity swelling or lymphedema, good proprioception and balance

## 2020-09-03 NOTE — HISTORY OF PRESENT ILLNESS
[de-identified] : 30 year old RHD male presents complaining of left forearm discomfort, as well as redness around the nail of the left ring finger. He denies biting his nails. He had been golfing with the wrong clubs for a few weeks and feels that attributed to his pain. He felt discomfort into the left forearm but it has improved. He switched to the proper clubs, which has helped. His PT has worked on his arm a couple of times. He denies numbness and tingling. He has not had this discomfort before.  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

## 2020-09-03 NOTE — DISCUSSION/SUMMARY
[de-identified] : I discussed the underlying pathophysiology of the patient's condition in great detail with the patient. We discussed the use of ice, Tylenol and anti-inflammatories to relieve pain. The patient was instructed in ROM exercises they are to do at home. At-home strengthening, and stretching exercises were discussed and demonstrated with the patient. When he is driving, I demonstrated to him how to stretch their elbow with their hand on the wheel while they are stopped at a red light. We went over the wide ranging benefits of exercise for the patient health and I encouraged him to begin a diligent exercise program. At this time, he will start a course of physical therapy for strengthening and flexibility. A prescription for physical therapy was provided. All of his questions were answered. He understands and consents to the plan. \par \par FU 1 month.\par after undergoing PT for his left elbow.

## 2020-09-03 NOTE — ADDENDUM
[FreeTextEntry1] : I, Sumeet Dorsey, acted solely as a scribe for Dr. Ryan Webber on this date 09/03/2020.\par All medical record entries made by the Scribe were at my, Dr. Ryan Webber, direction and personally dictated by me on 09/03/2020. I have reviewed the chart and agree that the record accurately reflects my personal performance of the history, physical exam, assessment and plan. I have also personally directed, reviewed, and agreed with the chart.

## 2020-09-17 ENCOUNTER — TRANSCRIPTION ENCOUNTER (OUTPATIENT)
Age: 30
End: 2020-09-17

## 2020-10-01 ENCOUNTER — APPOINTMENT (OUTPATIENT)
Dept: ORTHOPEDIC SURGERY | Facility: CLINIC | Age: 30
End: 2020-10-01

## 2020-10-14 ENCOUNTER — APPOINTMENT (OUTPATIENT)
Dept: ORTHOPEDIC SURGERY | Facility: CLINIC | Age: 30
End: 2020-10-14
Payer: COMMERCIAL

## 2020-10-14 PROCEDURE — 99214 OFFICE O/P EST MOD 30 MIN: CPT

## 2020-11-16 ENCOUNTER — APPOINTMENT (OUTPATIENT)
Dept: ORTHOPEDIC SURGERY | Facility: CLINIC | Age: 30
End: 2020-11-16
Payer: COMMERCIAL

## 2020-11-16 DIAGNOSIS — M77.12 LATERAL EPICONDYLITIS, LEFT ELBOW: ICD-10-CM

## 2020-11-16 PROCEDURE — 99072 ADDL SUPL MATRL&STAF TM PHE: CPT

## 2020-11-16 PROCEDURE — 99214 OFFICE O/P EST MOD 30 MIN: CPT

## 2020-11-16 PROCEDURE — 72040 X-RAY EXAM NECK SPINE 2-3 VW: CPT

## 2020-12-14 ENCOUNTER — APPOINTMENT (OUTPATIENT)
Dept: ORTHOPEDIC SURGERY | Facility: CLINIC | Age: 30
End: 2020-12-14

## 2020-12-21 ENCOUNTER — APPOINTMENT (OUTPATIENT)
Dept: ORTHOPEDIC SURGERY | Facility: CLINIC | Age: 30
End: 2020-12-21
Payer: COMMERCIAL

## 2020-12-21 DIAGNOSIS — M76.891 OTHER SPECIFIED ENTHESOPATHIES OF RIGHT LOWER LIMB, EXCLUDING FOOT: ICD-10-CM

## 2020-12-21 DIAGNOSIS — M76.51 PATELLAR TENDINITIS, RIGHT KNEE: ICD-10-CM

## 2020-12-21 PROCEDURE — 99214 OFFICE O/P EST MOD 30 MIN: CPT

## 2020-12-21 PROCEDURE — 99072 ADDL SUPL MATRL&STAF TM PHE: CPT

## 2021-01-20 NOTE — ED ADULT NURSE NOTE - CAS EDN DISCHARGE ASSESSMENT
History of A-fib on Eliquis and Toprol XL. CHADS-VASC 4, HAS-BLED 3.  -as patient may not be able to tolerate PO, will consider agatroban drip for anticoagulation Awake

## 2021-04-15 ENCOUNTER — APPOINTMENT (OUTPATIENT)
Dept: ORTHOPEDIC SURGERY | Facility: CLINIC | Age: 31
End: 2021-04-15

## 2021-07-19 ENCOUNTER — APPOINTMENT (OUTPATIENT)
Dept: ORTHOPEDIC SURGERY | Facility: CLINIC | Age: 31
End: 2021-07-19

## 2021-08-04 ENCOUNTER — APPOINTMENT (OUTPATIENT)
Dept: ORTHOPEDIC SURGERY | Facility: CLINIC | Age: 31
End: 2021-08-04

## 2021-08-17 ENCOUNTER — APPOINTMENT (OUTPATIENT)
Dept: ORTHOPEDIC SURGERY | Facility: CLINIC | Age: 31
End: 2021-08-17

## 2021-08-30 ENCOUNTER — APPOINTMENT (OUTPATIENT)
Dept: ORTHOPEDIC SURGERY | Facility: CLINIC | Age: 31
End: 2021-08-30

## 2021-09-09 ENCOUNTER — APPOINTMENT (OUTPATIENT)
Dept: ORTHOPEDIC SURGERY | Facility: CLINIC | Age: 31
End: 2021-09-09

## 2021-09-15 ENCOUNTER — APPOINTMENT (OUTPATIENT)
Dept: ORTHOPEDIC SURGERY | Facility: CLINIC | Age: 31
End: 2021-09-15

## 2021-09-27 ENCOUNTER — APPOINTMENT (OUTPATIENT)
Dept: ORTHOPEDIC SURGERY | Facility: CLINIC | Age: 31
End: 2021-09-27

## 2021-10-18 ENCOUNTER — APPOINTMENT (OUTPATIENT)
Dept: ORTHOPEDIC SURGERY | Facility: CLINIC | Age: 31
End: 2021-10-18
Payer: COMMERCIAL

## 2021-10-18 DIAGNOSIS — M21.40 FLAT FOOT [PES PLANUS] (ACQUIRED), UNSPECIFIED FOOT: ICD-10-CM

## 2021-10-18 PROCEDURE — 73610 X-RAY EXAM OF ANKLE: CPT | Mod: 50

## 2021-10-18 PROCEDURE — 99214 OFFICE O/P EST MOD 30 MIN: CPT

## 2021-10-18 NOTE — PHYSICAL EXAM
[de-identified] : Constitutional\par o Appearance : well-nourished, well developed, alert, in no acute distress \par Head and Face\par o Head :\par ¦ Inspection : atraumatic, normocephalic\par o Face :\par ¦ Inspection : no visible rash or discoloration\par Lymphatic\par o Epitrochlear Nodes : no lymphadenopathy \par o Popliteal Nodes : no palpable nodes present \par o Supraclavicular Nodes : no supraclavicular nodes present \par o Preauricular Nodes : no preauricular nodes present \par o Additional Nodes : no additional adenopathy present Skin and Subcutaneous Tissue \par o Head and Neck :\par ¦ Face : no facial lesions\par Neurologic\par o Mental Status Examination :\par ¦ Orientation : alert and oriented X 3\par o Coordination : finger-to-nose-to-finger testing normal bilaterally, heel-shin cerebellar test within normal limits bilaterally \par Psychiatric\par o Mood and Affect: mood normal, affect appropriate \par \par Right Lower Extremity\par o Right Ankle :\par ¦ Inspection/Palpation : tenderness over the mid aspect of the Achilles tendon, no swelling    \par ¦ Range of Motion : arc of motion full and painless in all planes\par ¦ Stability : no joint instability en provocative testing\par ¦ Strength : all muscles 5/5\par o Skin : no erythema or ecchymosis present\par o Sensation : sensation to pin intact\par o Tests and Signs : Ramirez test negative\par \par o Right Foot:\par ¦ Inspection/Palpation : no tenderness to palpation, no swelling\par ¦ Range of Motion : arc of motion full and painless in all planes\par ¦ Stability : no joint instability on provocative testing\par ¦ Strength : all muscles 5/5\par o Skin : no erythema or ecchymosis present\par \par Left Lower Extremity \par o Left Ankle :\par ¦ Inspection/Palpation : tenderness over the mid aspect of the Achilles tendon, no swelling    \par ¦ Range of Motion : arc of motion full and painless in all planes\par ¦ Stability : no joint instability en provocative testing\par ¦ Strength : all muscles 5/5\par o Skin : no erythema or ecchymosis present\par o Sensation : sensation to pin intact\par o Tests and Signs : Ramirez test negative\par \par o Left Foot:\par ¦ Inspection/Palpation : no tenderness to palpation, no swelling\par ¦ Range of Motion : arc of motion full and painless in all planes\par ¦ Stability : no joint instability on provocative testing\par ¦ Strength : all muscles 5/5\par o Skin : no erythema or ecchymosis present\par \par Dorsalis Pedis / Posterior Tibialis Pulse Bilaterally: 2+\par \par Gait and Station:\par Gait: gait normal, no significant extremity swelling or lymphedema, good proprioception and balance\par \par  Radiology Results \par o Right Ankle : AP, lateral and mortise views were obtained and revealed no significant degenerative arthritis of the ankle.\par o Left Ankle : AP, lateral and mortise views were obtained and revealed no significant degenerative arthritis of the ankle.

## 2021-10-18 NOTE — DISCUSSION/SUMMARY
[de-identified] : I discussed the underlying pathophysiology of the patient's condition in great detail with the patient. I went over the patient's x-rays with them in great detail. I discussed ergonomic solutions with the patient such as wearing cushioned shoes and orthotics. A prescription for orthotics was provided. He should bring them to his follow-up visit. I am recommending the patient continue going to physical therapy to obtain increases in their strength and mobility. A prescription was provided. I stressed the importance of continued weight loss and its benefits to the patient’s joints and overall health. All of his questions were answered. He understands and consents to the plan.\par \par FU 1 month.\par after continuing PT for his ankles\par after shoe wear modifications

## 2021-10-18 NOTE — ADDENDUM
[FreeTextEntry1] : I, Sumeet Dorsey, acted solely as a scribe for Dr. Ryan Webber on this date 10/18/2021.\par All medical record entries made by the Scribe were at my, Dr. Ryan Webber, direction and personally dictated by me on 10/18/2021. I have reviewed the chart and agree that the record accurately reflects my personal performance of the history, physical exam, assessment and plan. I have also personally directed, reviewed, and agreed with the chart.

## 2021-10-18 NOTE — HISTORY OF PRESENT ILLNESS
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 He saw a podiatrist. He has already done 10 weeks of PT at [facility] and is feeling better. He did try to play tennis again and had discomfort. At this point, the right ankle is worse than the left. He denies any instability. He notes hx of sprains of both deltoid ligaments, left side worse than right. He has stayed out of tennis and rides a stationary bike 3 times a week. Of Note, he is having weight problems due to the medications that he is on which cause weight gain. He was up to 309 pounds. He was switched to Geodon and is now down to 280. His goal is 200 pounds which he feels is realistic.

## 2021-11-15 ENCOUNTER — APPOINTMENT (OUTPATIENT)
Dept: ORTHOPEDIC SURGERY | Facility: CLINIC | Age: 31
End: 2021-11-15

## 2021-12-13 ENCOUNTER — APPOINTMENT (OUTPATIENT)
Dept: ORTHOPEDIC SURGERY | Facility: CLINIC | Age: 31
End: 2021-12-13

## 2022-01-20 ENCOUNTER — APPOINTMENT (OUTPATIENT)
Dept: ORTHOPEDIC SURGERY | Facility: CLINIC | Age: 32
End: 2022-01-20
Payer: COMMERCIAL

## 2022-01-20 DIAGNOSIS — M76.62 ACHILLES TENDINITIS, RIGHT LEG: ICD-10-CM

## 2022-01-20 DIAGNOSIS — M76.61 ACHILLES TENDINITIS, RIGHT LEG: ICD-10-CM

## 2022-01-20 PROCEDURE — 99214 OFFICE O/P EST MOD 30 MIN: CPT

## 2022-01-20 PROCEDURE — 73030 X-RAY EXAM OF SHOULDER: CPT | Mod: RT

## 2022-01-20 NOTE — ADDENDUM
[FreeTextEntry1] : I, Sumeet Dorsey, acted solely as a scribe for Dr. Ryan Webber on this date 01/20/2022.\par All medical record entries made by the Scribe were at my, Dr. Ryan Webber, direction and personally dictated by me on 01/20/2022. I have reviewed the chart and agree that the record accurately reflects my personal performance of the history, physical exam, assessment and plan. I have also personally directed, reviewed, and agreed with the chart.

## 2022-01-20 NOTE — DISCUSSION/SUMMARY
[de-identified] : I discussed the underlying pathophysiology of the patient's condition in great detail with the patient. I went over the patient's x-rays with them in great detail. At this time, he will start a course of physical therapy to strengthen his right shoulder. A prescription was provided that includes his right shoulder and bilateral Achilles. All of his questions were answered. He understands and consents to the plan.\par \par FU 4-6 weeks.\par after undergoing PT for his right shoulder and bilateral Achilles.

## 2022-01-20 NOTE — PHYSICAL EXAM
[de-identified] : Constitutional\par o Appearance : well-nourished, well developed, alert, in no acute distress \par Head and Face\par o Head :\par ¦ Inspection : atraumatic, normocephalic\par o Face :\par ¦ Inspection : no visible rash or discoloration\par Respiratory\par o Respiratory Effort: breathing unlabored \par Neurologic\par o Sensation : Normal sensation \par Psychiatric\par o Mood and Affect: mood normal, affect appropriate \par Lymphatic\par o Additional Nodes : No palpable lymph nodes present \par \par Cervical Spine\par o Inspection/Palpation :\par ¦ Inspection : alignment midline, normal degree of lordosis present\par ¦ Skin : normal appearance, no masses or tenderness, trachea midline\par ¦ Palpation : right paracervical tenderness, no left paracervical tenderness \par o Range of Motion : full flexion causes neck discomfort, FROM\par o Tests: Negative Spurling’s test \par \par Right Upper Extremity\par o Right Shoulder :\par ¦ Inspection/Palpation : no anterior capsular tenderness, mild AC joint tenderness, no swelling or deformities\par ¦ Range of Motion : full and painless in all planes, no crepitance\par ¦ Strength : forward elevation 4/5, internal rotation, external rotation, external rotation at 90° of abduction 4/5, supraspinatus 4/5, adduction and abduction 4/5, biceps/triceps 5/5 \par ¦ Stability : no anterior or posterior or inferior joint instability on provocative testing \par ¦ Tests: Veloz negative, Neer test negative, Michael test negative, drop arm test negative\par \par Left Upper Extremity\par o Left Shoulder :\par ¦ Inspection/Palpation : no tenderness, no swelling or deformities\par ¦ Range of Motion : full and painless in all planes, no crepitance\par ¦ Strength :  forward elevation, internal rotation 5/5, external rotation 5/5, external rotation at 90 degrees of abduction, supraspinatus, adduction and abduction, biceps/triceps, 5/5\par ¦ Stability : no joint instability on provocative testing\par ¦ Tests: Veloz negative, Neer test negative, Michael test negative, drop arm test negative\par \par Right Lower Extremity \par o Right Ankle :\par ¦ Inspection/Palpation : Achilles tendon tenderness to palpation, no nodule, no swelling    \par ¦ Range of Motion : arc of motion full and painless in all planes\par ¦ Stability : no joint instability en provocative testing\par ¦ Strength : all muscles 5/5\par o Skin : no erythema or ecchymosis present\par o Sensation : sensation to pin intact\par o Tests and Signs : Ramirez test negative \par \par Left Lower Extremity \par o Left Ankle :\par ¦ Inspection/Palpation : no tenderness to palpation, no swelling    \par ¦ Range of Motion : arc of motion full and painless in all planes\par ¦ Stability : no joint instability en provocative testing\par ¦ Strength : all muscles 5/5\par o Skin : no erythema or ecchymosis present\par o Sensation : sensation to pin intact\par o Tests and Signs : Ramirez test negative \par \par Gait and Station:\par Gait: gait normal, no significant extremity swelling or lymphedema, good proprioception and balance\par \par Radiology Results \par o Right Shoulder : AP internal/external rotation and outlet views were obtained and revealed a downsloping acromion, no lytic lesions, no significant degenerative arthritis.

## 2022-01-20 NOTE — HISTORY OF PRESENT ILLNESS
[de-identified] : 31 year old RHD male presents complaining of right shoulder pain that started 3 days ago. He was playing indoor tennis and felt his shoulder become tender during a one-handed backhand. He denies a pop. He thinks it was an overuse injury. He denies weakness. It has gotten better. He is still in PT for bilateral Achilles pain, right worse than left.\par Of Note: He is having weight problems due to the medications that he is on which cause weight gain. He was switched to Geodon and down to 254.6. His goal is 200 pounds which he feels is realistic. \par \par Radiology Results taken on 10/18/2021:\par o Right Ankle : AP, lateral and mortise views were obtained and revealed no significant degenerative arthritis of the ankle.\par o Left Ankle : AP, lateral and mortise views were obtained and revealed no significant degenerative arthritis of the ankle.

## 2022-03-10 ENCOUNTER — APPOINTMENT (OUTPATIENT)
Dept: ORTHOPEDIC SURGERY | Facility: CLINIC | Age: 32
End: 2022-03-10

## 2022-03-17 ENCOUNTER — APPOINTMENT (OUTPATIENT)
Dept: ORTHOPEDIC SURGERY | Facility: CLINIC | Age: 32
End: 2022-03-17

## 2022-03-21 ENCOUNTER — APPOINTMENT (OUTPATIENT)
Dept: ORTHOPEDIC SURGERY | Facility: CLINIC | Age: 32
End: 2022-03-21

## 2022-03-24 ENCOUNTER — APPOINTMENT (OUTPATIENT)
Dept: ORTHOPEDIC SURGERY | Facility: CLINIC | Age: 32
End: 2022-03-24

## 2022-03-28 ENCOUNTER — APPOINTMENT (OUTPATIENT)
Dept: OPHTHALMOLOGY | Facility: CLINIC | Age: 32
End: 2022-03-28
Payer: COMMERCIAL

## 2022-03-28 ENCOUNTER — NON-APPOINTMENT (OUTPATIENT)
Age: 32
End: 2022-03-28

## 2022-03-28 PROCEDURE — 92025 CPTRIZED CORNEAL TOPOGRAPHY: CPT

## 2022-03-28 PROCEDURE — 92004 COMPRE OPH EXAM NEW PT 1/>: CPT

## 2022-03-30 ENCOUNTER — APPOINTMENT (OUTPATIENT)
Dept: ORTHOPEDIC SURGERY | Facility: CLINIC | Age: 32
End: 2022-03-30

## 2022-03-31 ENCOUNTER — APPOINTMENT (OUTPATIENT)
Dept: ORTHOPEDIC SURGERY | Facility: CLINIC | Age: 32
End: 2022-03-31

## 2022-05-26 ENCOUNTER — APPOINTMENT (OUTPATIENT)
Dept: ORTHOPEDIC SURGERY | Facility: CLINIC | Age: 32
End: 2022-05-26
Payer: COMMERCIAL

## 2022-05-26 DIAGNOSIS — M75.81 OTHER SHOULDER LESIONS, RIGHT SHOULDER: ICD-10-CM

## 2022-05-26 PROCEDURE — 99213 OFFICE O/P EST LOW 20 MIN: CPT

## 2022-05-26 PROCEDURE — 72040 X-RAY EXAM NECK SPINE 2-3 VW: CPT

## 2022-05-26 NOTE — ADDENDUM
[FreeTextEntry1] : I, Sumeet Dorsey, acted solely as a scribe for Dr. Ryan Webber on this date 05/26/2022.\par All medical record entries made by the Scribe were at my, Dr. Ryan Webber, direction and personally dictated by me on 05/26/2022. I have reviewed the chart and agree that the record accurately reflects my personal performance of the history, physical exam, assessment and plan. I have also personally directed, reviewed, and agreed with the chart.

## 2022-05-26 NOTE — HISTORY OF PRESENT ILLNESS
[de-identified] : 31 year old RHD male presents complaining of right-sided neck pain. He denies an injury. He was doing a lot of reading and tennis. He had two physical therapists look at it. One massaged the right side of his neck, which helped. The other thought he has a problem with a nerve from one of his ribs. He notes having a similar pain in the past and was told to go to a Chiropractor. He never went.\par Of Note: He is having weight problems due to the medications that he is on which cause weight gain. He was switched to Geodon and down to 225. His goal is 210 pounds. \par \par Radiology Results taken on 01/20/2022:\par o Right Shoulder : AP internal/external rotation and outlet views were obtained and revealed a downsloping acromion, no lytic lesions, no significant degenerative arthritis. \par \par Radiology Results taken on 10/18/2021:\par o Right Ankle : AP, lateral and mortise views were obtained and revealed no significant degenerative arthritis of the ankle.\par o Left Ankle : AP, lateral and mortise views were obtained and revealed no significant degenerative arthritis of the ankle.

## 2022-05-26 NOTE — PHYSICAL EXAM
[de-identified] : Constitutional\par o Appearance : well-nourished, well developed, alert, in no acute distress \par Head and Face\par o Head :\par ¦ Inspection : atraumatic, normocephalic\par o Face :\par ¦ Inspection : no visible rash or discoloration\par Respiratory\par o Respiratory Effort: breathing unlabored \par Neurologic\par o Sensation : Normal sensation \par Psychiatric\par o Mood and Affect: mood normal, affect appropriate \par Lymphatic\par o Additional Nodes : No palpable lymph nodes present \par \par Cervical Spine\par o Inspection/Palpation :\par ¦ Inspection : alignment midline, normal degree of lordosis present\par ¦ Skin : normal appearance, no masses or tenderness, trachea midline\par ¦ Palpation : right paracervical tenderness, left parascapular tenderness \par o Range of Motion : FROM, sidebending does not cause discomfort \par o Tests: Negative Spurling’s test \par \par Right Upper Extremity\par o Right Shoulder :\par ¦ Inspection/Palpation : no anterior capsular tenderness, mild AC joint tenderness, no swelling or deformities\par ¦ Range of Motion : full and painless in all planes, no crepitance\par ¦ Strength : forward elevation 5/5, internal rotation 5/5, external rotation 5/5, external rotation at 90° of abduction 5/5, supraspinatus 5/5, adduction and abduction 5/5, biceps/triceps 5/5 \par ¦ Stability : no anterior or posterior or inferior joint instability on provocative testing \par ¦ Tests: Veloz negative, Neer test negative, Michael test negative, drop arm test negative\par \par Left Upper Extremity\par o Left Shoulder :\par ¦ Inspection/Palpation : no tenderness, no swelling or deformities\par ¦ Range of Motion : full and painless in all planes, no crepitance\par ¦ Strength : forward elevation 5/5, internal rotation 5/5, external rotation 5/5, external rotation at 90° of abduction 5/5, supraspinatus 5/5, adduction and abduction 5/5, biceps/triceps 5/5 \par ¦ Stability : no joint instability on provocative testing\par ¦ Tests: Veloz negative, Neer test negative, Michael test negative, drop arm test negative\par \par Right Lower Extremity \par o Right Ankle :\par ¦ Inspection/Palpation : Achilles tendon tenderness to palpation, no nodule, no swelling    \par ¦ Range of Motion : arc of motion full and painless in all planes\par ¦ Stability : no joint instability en provocative testing\par ¦ Strength : all muscles 5/5\par o Skin : no erythema or ecchymosis present\par o Sensation : sensation to pin intact\par o Tests and Signs : Ramirez test negative \par \par Left Lower Extremity \par o Left Ankle :\par ¦ Inspection/Palpation : no tenderness to palpation, no swelling    \par ¦ Range of Motion : arc of motion full and painless in all planes\par ¦ Stability : no joint instability en provocative testing\par ¦ Strength : all muscles 5/5\par o Skin : no erythema or ecchymosis present\par o Sensation : sensation to pin intact\par o Tests and Signs : Ramirez test negative \par \par Gait and Station:\par Gait: gait normal, no significant extremity swelling or lymphedema, good proprioception and balance\par \par Radiology Results \par o Cervical Spine : A/P and lateral views were obtained and reveal straightening of the normal cervical lordosis, no significant degenerative disc disease.

## 2022-06-06 ENCOUNTER — APPOINTMENT (OUTPATIENT)
Dept: ORTHOPEDIC SURGERY | Facility: CLINIC | Age: 32
End: 2022-06-06

## 2022-06-08 ENCOUNTER — APPOINTMENT (OUTPATIENT)
Dept: ORTHOPEDIC SURGERY | Facility: CLINIC | Age: 32
End: 2022-06-08

## 2022-07-06 ENCOUNTER — APPOINTMENT (OUTPATIENT)
Dept: ORTHOPEDIC SURGERY | Facility: CLINIC | Age: 32
End: 2022-07-06

## 2022-07-06 DIAGNOSIS — M62.838 OTHER MUSCLE SPASM: ICD-10-CM

## 2022-07-06 PROCEDURE — 99213 OFFICE O/P EST LOW 20 MIN: CPT

## 2022-07-06 NOTE — DISCUSSION/SUMMARY
[de-identified] : I went over the pathophysiology of the patient's symptoms in great detail with the patient. At-home neck exercises were discussed and demonstrated in great detail with the patient. I am recommending the patient continues going to physical therapy. A prescription was provided. All of his questions were answered. He understands and consents to the plan.\par \par FU in 6 weeks.\par after continuing PT for his neck.

## 2022-07-06 NOTE — PHYSICAL EXAM
[de-identified] : Constitutional\par o Appearance : well-nourished, well developed, alert, in no acute distress \par Head and Face\par o Head :\par ¦ Inspection : atraumatic, normocephalic\par o Face :\par ¦ Inspection : no visible rash or discoloration\par Respiratory\par o Respiratory Effort: breathing unlabored \par Neurologic\par o Sensation : Normal sensation \par Psychiatric\par o Mood and Affect: mood normal, affect appropriate \par Lymphatic\par o Additional Nodes : No palpable lymph nodes present \par \par Cervical Spine\par o Inspection/Palpation :\par ¦ Inspection : alignment midline, normal degree of lordosis present\par ¦ Skin : normal appearance, no masses or tenderness, trachea midline\par ¦ Palpation : left > right paracervical tenderness, left parascapular tenderness, no right parascapular tenderness \par o Range of Motion : full flexion causes left parathoracic discomfort, extension causes no discomfort, full rotation \par o Tests: Negative Spurling’s test\par \par Right Upper Extremity\par o Right Shoulder :\par ¦ Inspection/Palpation : no anterior capsular tenderness, mild AC joint tenderness, no swelling or deformities\par ¦ Range of Motion : full and painless in all planes, no crepitance\par ¦ Strength : forward elevation 5/5, internal rotation 5/5, external rotation 5/5, external rotation at 90° of abduction 5/5, supraspinatus 5/5, adduction and abduction 5/5, biceps/triceps 5/5 \par ¦ Stability : no anterior or posterior or inferior joint instability on provocative testing \par ¦ Tests: Veloz negative, Neer test negative, Michael test negative, drop arm test negative\par \par Left Upper Extremity\par o Left Shoulder :\par ¦ Inspection/Palpation : no tenderness, no swelling or deformities\par ¦ Range of Motion : full and painless in all planes, no crepitance\par ¦ Strength : forward elevation 5/5, internal rotation 5/5, external rotation 5/5, external rotation at 90° of abduction 5/5, supraspinatus 5/5, adduction and abduction 5/5, biceps/triceps 5/5 \par ¦ Stability : no joint instability on provocative testing\par ¦ Tests: Veloz negative, Neer test negative, Michael test negative, drop arm test negative\par \par Right Lower Extremity \par o Right Ankle :\par ¦ Inspection/Palpation : Achilles tendon tenderness to palpation, no nodule, no swelling    \par ¦ Range of Motion : arc of motion full and painless in all planes\par ¦ Stability : no joint instability en provocative testing\par ¦ Strength : all muscles 5/5\par o Skin : no erythema or ecchymosis present\par o Sensation : sensation to pin intact\par o Tests and Signs : Ramirez test negative \par \par Left Lower Extremity \par o Left Ankle :\par ¦ Inspection/Palpation : no tenderness to palpation, no swelling    \par ¦ Range of Motion : arc of motion full and painless in all planes\par ¦ Stability : no joint instability en provocative testing\par ¦ Strength : all muscles 5/5\par o Skin : no erythema or ecchymosis present\par o Sensation : sensation to pin intact\par o Tests and Signs : Ramirez test negative \par \par Gait and Station:\par Gait: gait normal, no significant extremity swelling or lymphedema, good proprioception and balance

## 2022-07-06 NOTE — HISTORY OF PRESENT ILLNESS
[de-identified] : 32 year old RHD male presents for follow-up evaluation of right-sided neck pain. He went to 5 PT sessions and is feeling better. He is feeling substantially less pain and thinks the ROM did improve. He admits that he did not put in much effort at PT. He is not limited by his neck. He denies numbness and tingling in his fingers. \par He was switched to Geodon and is losing weight. \par \par Radiology Results 05/26/2022:\par o Cervical Spine : A/P and lateral views were obtained and reveal straightening of the normal cervical lordosis, no significant degenerative disc disease. \par \par Radiology Results 01/20/2022:\par o Right Shoulder : AP internal/external rotation and outlet views were obtained and revealed a downsloping acromion, no lytic lesions, no significant degenerative arthritis. \par \par Radiology Results 10/18/2021:\par o Right Ankle : AP, lateral and mortise views were obtained and revealed no significant degenerative arthritis of the ankle.\par o Left Ankle : AP, lateral and mortise views were obtained and revealed no significant degenerative arthritis of the ankle.

## 2022-07-06 NOTE — ADDENDUM
[FreeTextEntry1] : I, Sumeet Dorsey, acted solely as a scribe for Dr. Ryan Webber on this date 07/06/2022.\par All medical record entries made by the Scribe were at my, Dr. Ryan Webber, direction and personally dictated by me on 07/06/2022. I have reviewed the chart and agree that the record accurately reflects my personal performance of the history, physical exam, assessment and plan. I have also personally directed, reviewed, and agreed with the chart.

## 2022-08-01 ENCOUNTER — NON-APPOINTMENT (OUTPATIENT)
Age: 32
End: 2022-08-01

## 2022-08-01 ENCOUNTER — APPOINTMENT (OUTPATIENT)
Dept: OPHTHALMOLOGY | Facility: CLINIC | Age: 32
End: 2022-08-01

## 2022-08-01 PROCEDURE — 92025 CPTRIZED CORNEAL TOPOGRAPHY: CPT

## 2022-08-01 PROCEDURE — 92012 INTRM OPH EXAM EST PATIENT: CPT

## 2023-02-07 ENCOUNTER — APPOINTMENT (OUTPATIENT)
Dept: OPHTHALMOLOGY | Facility: CLINIC | Age: 33
End: 2023-02-07
Payer: COMMERCIAL

## 2023-02-07 ENCOUNTER — NON-APPOINTMENT (OUTPATIENT)
Age: 33
End: 2023-02-07

## 2023-02-07 PROCEDURE — 92012 INTRM OPH EXAM EST PATIENT: CPT

## 2023-02-07 PROCEDURE — 92025 CPTRIZED CORNEAL TOPOGRAPHY: CPT

## 2023-05-01 ENCOUNTER — APPOINTMENT (OUTPATIENT)
Dept: OPHTHALMOLOGY | Facility: CLINIC | Age: 33
End: 2023-05-01

## 2023-05-22 ENCOUNTER — APPOINTMENT (OUTPATIENT)
Dept: OPHTHALMOLOGY | Facility: CLINIC | Age: 33
End: 2023-05-22

## 2023-05-23 ENCOUNTER — APPOINTMENT (OUTPATIENT)
Dept: OPHTHALMOLOGY | Facility: CLINIC | Age: 33
End: 2023-05-23

## 2024-03-01 NOTE — ED ADULT NURSE NOTE - NS ED NURSE DISCH DISPOSITION
You had a pain management procedure today.    Observe/ monitor for the following signs & symptoms:  If you notice Excessive bleeding (slow general oozing that completely soaks the dressing, or fresh bright red bleeding).   In either case, apply pressure to the area, elevate it if possible & call your doctor at once.    Also observe for:  Change in color  Numbness/tingling  Coldness to the touch  Swelling  Drainage  Temperature of 101.5 or higher.  Increased, uncontrollable pain.    *If you notice the above signs & symptoms, please call your doctor right away!*      Discharge Instructions:    Your pain may not be gone immediately after this procedure; it generally takes 3 to 5 days for the steroid to work.   Keep the needle site clean & dry for 24 hours.  Continue your present medications.  Make an appointment to see your doctor in 2-3 weeks.  If any problems occur, or if you have any further questions, please call as soon as possible. If you find that you cannot reach your doctor, but feel that the condition nees a doctor's attention, go to the closest emergency department & take this discharge paper with you.       Dr. Redd's Office: [phone]          Admitted